# Patient Record
(demographics unavailable — no encounter records)

---

## 2025-01-24 NOTE — PHYSICAL EXAM
[NL (120)] : flexion 120 degrees [NL (30)] : extension 30 degrees [5___] : extension 5[unfilled]/5 [] : negative impingement maneuvers [Right] : right hip [AP] : anteroposterior [Lateral] : lateral [There are no fractures, subluxations or dislocations. No significant abnormalities are seen] : There are no fractures, subluxations or dislocations. No significant abnormalities are seen

## 2025-01-24 NOTE — HISTORY OF PRESENT ILLNESS
[Right Leg] : right leg [Sudden] : sudden [9] : 9 [Radiating] : radiating [Rest] : rest [Sitting] : sitting [Full time] : Work status: full time [de-identified] : 51 year old male with pain in the right hip region, gluteal region. Symptoms started last Saturday after a work out, he went a little heavier on legs and symptoms have been persistent. Using a tens unit, took Aleve (should avoid had partial nephrectomy secondary to kidney CA), Tylenol with only mild help. Worse at night when sleeping or the leg is extended.   [] : no [FreeTextEntry5] : Last Saturday started to get pain in rt quad into glute [FreeTextEntry6] : discomfort  [FreeTextEntry9] : occ Aleve, changing poisitions, Tens Unit [de-identified] : Drug rep

## 2025-01-24 NOTE — DISCUSSION/SUMMARY
[de-identified] : modify activities use elastic sleeve for structural support try OTC meds ice as needed try topical lidocaine for pain control reviewed current medications used by this patient home exercises for functional return 01/24/2025    RE:  AWA PETERS   Acct #- [id number]    Attention:  Nurse Reviewer /Medical Director  I am writing this letter as a medical necessity for PT program. Patient has tried analgesics, non-steroid anti-inflammatory agents,  hot or cold compresses,injections of corticosteroids, etc)  which in combination or by themselves has not worked. Based on my patient's condition, I strongly believe that the PT is medically needed.   Thank you for your time and consideration.     try lido patch

## 2025-01-31 NOTE — PHYSICAL EXAM
[No Acute Distress] : no acute distress [Well Nourished] : well nourished [Well Developed] : well developed [Well-Appearing] : well-appearing [Normal Sclera/Conjunctiva] : normal sclera/conjunctiva [PERRL] : pupils equal round and reactive to light [EOMI] : extraocular movements intact [Normal Outer Ear/Nose] : the outer ears and nose were normal in appearance [Normal Oropharynx] : the oropharynx was normal [No JVD] : no jugular venous distention [No Lymphadenopathy] : no lymphadenopathy [Supple] : supple [Thyroid Normal, No Nodules] : the thyroid was normal and there were no nodules present [No Respiratory Distress] : no respiratory distress  [No Accessory Muscle Use] : no accessory muscle use [Clear to Auscultation] : lungs were clear to auscultation bilaterally [Normal Rate] : normal rate  [Regular Rhythm] : with a regular rhythm [Normal S1, S2] : normal S1 and S2 [No Murmur] : no murmur heard [No Carotid Bruits] : no carotid bruits [No Abdominal Bruit] : a ~M bruit was not heard ~T in the abdomen [No Varicosities] : no varicosities [Pedal Pulses Present] : the pedal pulses are present [No Edema] : there was no peripheral edema [No Palpable Aorta] : no palpable aorta [No Extremity Clubbing/Cyanosis] : no extremity clubbing/cyanosis [Soft] : abdomen soft [Non Tender] : non-tender [Non-distended] : non-distended [No Masses] : no abdominal mass palpated [No HSM] : no HSM [Normal Bowel Sounds] : normal bowel sounds [Normal Posterior Cervical Nodes] : no posterior cervical lymphadenopathy [Normal Anterior Cervical Nodes] : no anterior cervical lymphadenopathy [No CVA Tenderness] : no CVA  tenderness [No Spinal Tenderness] : no spinal tenderness [No Rash] : no rash [Normal Gait] : normal gait [Normal] : Normal [Normal Affect] : the affect was normal [Normal Insight/Judgement] : insight and judgment were intact [Comprehensive Foot Exam Normal] : Right and left foot were examined and both feet are normal. No ulcers in either foot. Toes are normal and with full ROM.  Normal tactile sensation with monofilament testing throughout both feet [de-identified] : L Spine - Decreased ROM and Strength, Pain Associated with Mvt.  + R Leg Raise Test.   [de-identified] : Decreased R Lower Extremity Reflexes [TextBox_2] : Yes [TextBox_4] : HS

## 2025-01-31 NOTE — HISTORY OF PRESENT ILLNESS
[FreeTextEntry1] : C/O Bsck Pain with Radiation of Pain and Weakness to R Lower Extremity x 6 wks. [de-identified] : C/O Bsck Pain with Radiation of Pain and Weakness to R Lower Extremity x 6 wks.  Pt. has been goting to Chiropractor and PT, however, his symptoms have not improved.

## 2025-01-31 NOTE — HEALTH RISK ASSESSMENT
[Yes] : Yes [Monthly or less (1 pt)] : Monthly or less (1 point) [1 or 2 (0 pts)] : 1 or 2 (0 points) [Never (0 pts)] : Never (0 points) [No] : In the past 12 months have you used drugs other than those required for medical reasons? No [No falls in past year] : Patient reported no falls in the past year [0] : 2) Feeling down, depressed, or hopeless: Not at all (0) [PHQ-2 Negative - No further assessment needed] : PHQ-2 Negative - No further assessment needed [Patient/Caregiver not ready to engage] : , patient/caregiver not ready to engage [I will adhere to the patient's wishes.] : I will adhere to the patient's wishes. [Time Spent: ___ minutes] : Time Spent: [unfilled] minutes [Never] : Never [de-identified] : Average [Audit-CScore] : 1 [de-identified] : Average [facility] [[id number]] : 0 [AdvancecareDate] : 02/25

## 2025-01-31 NOTE — COUNSELING
[Fall prevention counseling provided] : Fall prevention counseling provided [Adequate lighting] : Adequate lighting [No throw rugs] : No throw rugs [Use proper foot wear] : Use proper foot wear [Behavioral health counseling provided] : Behavioral health counseling provided [Sleep ___ hours/day] : Sleep [unfilled] hours/day [Engage in a relaxing activity] : Engage in a relaxing activity [Plan in advance] : Plan in advance [Cessation strategies including cessation program discussed] : Cessation strategies including cessation program discussed [AUDIT-C Screening administered and reviewed] : AUDIT-C Screening administered and reviewed [Potential consequences of obesity discussed] : Potential consequences of obesity discussed [Benefits of weight loss discussed] : Benefits of weight loss discussed [Encouraged to increase physical activity] : Encouraged to increase physical activity [Weigh Self Weekly] : weigh self weekly [Decrease Portions] : decrease portions [None] : None [Good understanding] : Patient has a good understanding of lifestyle changes and steps needed to achieve self management goal

## 2025-01-31 NOTE — REVIEW OF SYSTEMS
[Patient Intake Form Reviewed] : Patient intake form was reviewed [Negative] : Heme/Lymph [FreeTextEntry2] : Overweight [FreeTextEntry5] : AVR, HTN [FreeTextEntry8] : BPH w LUTS, Nephrolithiasis, Low T [de-identified] : PTSD

## 2025-06-10 NOTE — PLAN
[FreeTextEntry1] : 52 yo male PMHx of renal cell carcinoma, CAD, HTN, DM, Obesity, and aortic valve replacement presents for 3 month follow up. Pt denies any concerns and complaints. Pt denies any recent hospitalizations, traumas, and illnesses. Pt denies fever, chills, headache, sob, chest pain. abdominal pain.  Care plan reviewed  Medications renewed and reviewed; changed bupropion to XR 24hrs 150 mg daily Labs drawn A1C 4.7% (last A1C 4.8%) RTC in 3 months

## 2025-06-10 NOTE — HISTORY OF PRESENT ILLNESS
[FreeTextEntry1] : 50 yo male PMHx of renal cell carcinoma, CAD, HTN, DM, Obesity, and aortic valve replacement presents for 3 month follow up.   [de-identified] : 52 yo male PMHx of renal cell carcinoma, CAD, HTN, DM, Obesity, and aortic valve replacement presents for 3 month follow up. Pt denies any concerns and complaints. Pt denies any recent hospitalizations, traumas, and illnesses. Pt denies fever, chills, headache, sob, chest pain. abdominal pain.

## 2025-06-10 NOTE — HEALTH RISK ASSESSMENT
[Never (0 pts)] : Never (0 points) [No] : In the past 12 months have you used drugs other than those required for medical reasons? No [No falls in past year] : Patient reported no falls in the past year [Patient/Caregiver not ready to engage] : , patient/caregiver not ready to engage [I will adhere to the patient's wishes.] : I will adhere to the patient's wishes. [Time Spent: ___ minutes] : Time Spent: [unfilled] minutes [de-identified] : Average [facility] : [facility] [de-identified] : Average [AdvancecareDate] : 02/25 [Monthly or less (1 pt)] : Monthly or less (1 point) [Yes] : Yes [1 or 2 (0 pts)] : 1 or 2 (0 points) [0] : 2) Feeling down, depressed, or hopeless: Not at all (0) [PHQ-2 Negative - No further assessment needed] : PHQ-2 Negative - No further assessment needed [Never] : Never [Audit-CScore] : 1 [[id number]] : 0

## 2025-06-10 NOTE — REVIEW OF SYSTEMS
[Patient Intake Form Reviewed] : Patient intake form was reviewed [FreeTextEntry5] : AVR, HTN [FreeTextEntry8] : BPH w LUTS, Nephrolithiasis, Low T [de-identified] : PTSD [Negative] : Heme/Lymph [FreeTextEntry2] : Obesity

## 2025-06-10 NOTE — PHYSICAL EXAM
[Normal Posterior Cervical Nodes] : no posterior cervical lymphadenopathy [Normal Anterior Cervical Nodes] : no anterior cervical lymphadenopathy [Normal] : Normal [Comprehensive Foot Exam Normal] : Right and left foot were examined and both feet are normal. No ulcers in either foot. Toes are normal and with full ROM.  Normal tactile sensation with monofilament testing throughout both feet [de-identified] : L Spine - Decreased ROM and Strength, Pain Associated with Mvt.  + R Leg Raise Test.   [de-identified] : Decreased R Lower Extremity Reflexes [TextBox_2] : Yes [TextBox_4] : HS [No Acute Distress] : no acute distress [Well Nourished] : well nourished [Well Developed] : well developed [Well-Appearing] : well-appearing [Normal Sclera/Conjunctiva] : normal sclera/conjunctiva [EOMI] : extraocular movements intact [PERRL] : pupils equal round and reactive to light [Normal Outer Ear/Nose] : the outer ears and nose were normal in appearance [No JVD] : no jugular venous distention [Normal Oropharynx] : the oropharynx was normal [No Lymphadenopathy] : no lymphadenopathy [Supple] : supple [Thyroid Normal, No Nodules] : the thyroid was normal and there were no nodules present [No Accessory Muscle Use] : no accessory muscle use [No Respiratory Distress] : no respiratory distress  [Clear to Auscultation] : lungs were clear to auscultation bilaterally [Normal Rate] : normal rate  [Regular Rhythm] : with a regular rhythm [Normal S1, S2] : normal S1 and S2 [No Murmur] : no murmur heard [No Carotid Bruits] : no carotid bruits [No Abdominal Bruit] : a ~M bruit was not heard ~T in the abdomen [No Varicosities] : no varicosities [Pedal Pulses Present] : the pedal pulses are present [No Edema] : there was no peripheral edema [No Palpable Aorta] : no palpable aorta [Soft] : abdomen soft [No Extremity Clubbing/Cyanosis] : no extremity clubbing/cyanosis [Non-distended] : non-distended [Non Tender] : non-tender [No Masses] : no abdominal mass palpated [No HSM] : no HSM [Normal Bowel Sounds] : normal bowel sounds [No CVA Tenderness] : no CVA  tenderness [No Spinal Tenderness] : no spinal tenderness [No Joint Swelling] : no joint swelling [No Rash] : no rash [Grossly Normal Strength/Tone] : grossly normal strength/tone [Coordination Grossly Intact] : coordination grossly intact [Normal Gait] : normal gait [No Focal Deficits] : no focal deficits [Normal Affect] : the affect was normal [Normal Insight/Judgement] : insight and judgment were intact

## 2025-06-10 NOTE — ASSESSMENT
[FreeTextEntry1] : 52 yo male PMHx of renal cell carcinoma, CAD, HTN, DM, Obesity, and aortic valve replacement presents for 1 month follow up. Pt denies any concerns and complaints. Pt denies any recent hospitalizations, traumas, and illnesses. Pt denies fever, chills, headache, sob, chest pain. abdominal pain.  Care plan reviewed  Medications renewed and reviewed  - Pt will pay cash with coupon for Modafinil Labs drawn Pt denies scan and states that he will follow up with his doctors in [facility] RTC in 3 months

## 2025-06-10 NOTE — HISTORY OF PRESENT ILLNESS
[FreeTextEntry1] : 52 yo male PMHx of renal cell carcinoma, CAD, HTN, DM, Obesity, and aortic valve replacement presents for 3 month follow up.   [de-identified] : 50 yo male PMHx of renal cell carcinoma, CAD, HTN, DM, Obesity, and aortic valve replacement presents for 3 month follow up. Pt denies any concerns and complaints. Pt denies any recent hospitalizations, traumas, and illnesses. Pt denies fever, chills, headache, sob, chest pain. abdominal pain.

## 2025-06-10 NOTE — HEALTH RISK ASSESSMENT
[Never (0 pts)] : Never (0 points) [No] : In the past 12 months have you used drugs other than those required for medical reasons? No [No falls in past year] : Patient reported no falls in the past year [Patient/Caregiver not ready to engage] : , patient/caregiver not ready to engage [I will adhere to the patient's wishes.] : I will adhere to the patient's wishes. [Time Spent: ___ minutes] : Time Spent: [unfilled] minutes [de-identified] : Average [de-identified] : Average [facility]] : 6 [AdvancecareDate] : 02/25 [Monthly or less (1 pt)] : Monthly or less (1 point) [Yes] : Yes [1 or 2 (0 pts)] : 1 or 2 (0 points) [0] : 2) Feeling down, depressed, or hopeless: Not at all (0) [PHQ-2 Negative - No further assessment needed] : PHQ-2 Negative - No further assessment needed [Never] : Never [Audit-CScore] : 1 [[id number]] : 0

## 2025-06-10 NOTE — COUNSELING
[Fall prevention counseling provided] : Fall prevention counseling provided [Adequate lighting] : Adequate lighting [No throw rugs] : No throw rugs [Use proper foot wear] : Use proper foot wear [Behavioral health counseling provided] : Behavioral health counseling provided [Sleep ___ hours/day] : Sleep [unfilled] hours/day [Plan in advance] : Plan in advance [Cessation strategies including cessation program discussed] : Cessation strategies including cessation program discussed [AUDIT-C Screening administered and reviewed] : AUDIT-C Screening administered and reviewed [Encouraged to increase physical activity] : Encouraged to increase physical activity [Weigh Self Weekly] : weigh self weekly [Decrease Portions] : decrease portions [None] : None [Potential consequences of obesity discussed] : Potential consequences of obesity discussed [Engage in a relaxing activity] : Engage in a relaxing activity [Benefits of weight loss discussed] : Benefits of weight loss discussed [Encouraged to maintain food diary] : Encouraged to maintain food diary [Encouraged to use exercise tracking device] : Encouraged to use exercise tracking device [Good understanding] : Patient has a good understanding of lifestyle changes and steps needed to achieve self management goal

## 2025-06-10 NOTE — COUNSELING
[Fall prevention counseling provided] : Fall prevention counseling provided [Adequate lighting] : Adequate lighting [No throw rugs] : No throw rugs [Use proper foot wear] : Use proper foot wear [Behavioral health counseling provided] : Behavioral health counseling provided [Sleep ___ hours/day] : Sleep [unfilled] hours/day [Plan in advance] : Plan in advance [Cessation strategies including cessation program discussed] : Cessation strategies including cessation program discussed [AUDIT-C Screening administered and reviewed] : AUDIT-C Screening administered and reviewed [Encouraged to increase physical activity] : Encouraged to increase physical activity [Weigh Self Weekly] : weigh self weekly [Decrease Portions] : decrease portions [None] : None [Engage in a relaxing activity] : Engage in a relaxing activity [Potential consequences of obesity discussed] : Potential consequences of obesity discussed [Benefits of weight loss discussed] : Benefits of weight loss discussed [Encouraged to maintain food diary] : Encouraged to maintain food diary [Encouraged to use exercise tracking device] : Encouraged to use exercise tracking device [Good understanding] : Patient has a good understanding of lifestyle changes and steps needed to achieve self management goal

## 2025-06-10 NOTE — ASSESSMENT
[FreeTextEntry1] : 52 yo male PMHx of renal cell carcinoma, CAD, HTN, DM, Obesity, and aortic valve replacement presents for 1 month follow up. Pt denies any concerns and complaints. Pt denies any recent hospitalizations, traumas, and illnesses. Pt denies fever, chills, headache, sob, chest pain. abdominal pain.  Care plan reviewed  Medications renewed and reviewed  - Pt will pay cash with coupon for Modafinil Labs drawn Pt denies scan and states that he will follow up with his doctors in [facility] RTC in 3 months

## 2025-06-10 NOTE — ASSESSMENT
[FreeTextEntry1] : 52 yo male PMHx of renal cell carcinoma, CAD, HTN, DM, Obesity, and aortic valve replacement presents for 1 month follow up. Pt denies any concerns and complaints. Pt denies any recent hospitalizations, traumas, and illnesses. Pt denies fever, chills, headache, sob, chest pain. abdominal pain.  Care plan reviewed  Medications renewed and reviewed  - Pt will pay cash with coupon for Modafinil Labs drawn Pt denies scan and states that he will follow up with his doctors in [facility] RTC in 3 months

## 2025-06-10 NOTE — REVIEW OF SYSTEMS
[Patient Intake Form Reviewed] : Patient intake form was reviewed [FreeTextEntry5] : AVR, HTN [FreeTextEntry8] : BPH w LUTS, Nephrolithiasis, Low T [de-identified] : PTSD [Negative] : Heme/Lymph [FreeTextEntry2] : Obesity

## 2025-06-10 NOTE — REVIEW OF SYSTEMS
[Patient Intake Form Reviewed] : Patient intake form was reviewed [FreeTextEntry5] : AVR, HTN [FreeTextEntry8] : BPH w LUTS, Nephrolithiasis, Low T [de-identified] : PTSD [Negative] : Heme/Lymph [FreeTextEntry2] : Obesity

## 2025-06-10 NOTE — HISTORY OF PRESENT ILLNESS
[FreeTextEntry1] : 52 yo male PMHx of renal cell carcinoma, CAD, HTN, DM, Obesity, and aortic valve replacement presents for 3 month follow up.   [de-identified] : 50 yo male PMHx of renal cell carcinoma, CAD, HTN, DM, Obesity, and aortic valve replacement presents for 3 month follow up. Pt denies any concerns and complaints. Pt denies any recent hospitalizations, traumas, and illnesses. Pt denies fever, chills, headache, sob, chest pain. abdominal pain.

## 2025-06-10 NOTE — PLAN
[FreeTextEntry1] : 50 yo male PMHx of renal cell carcinoma, CAD, HTN, DM, Obesity, and aortic valve replacement presents for 3 month follow up. Pt denies any concerns and complaints. Pt denies any recent hospitalizations, traumas, and illnesses. Pt denies fever, chills, headache, sob, chest pain. abdominal pain.  Care plan reviewed  Medications renewed and reviewed; changed bupropion to XR 24hrs 150 mg daily Labs drawn A1C 4.7% (last A1C 4.8%) RTC in 3 months

## 2025-06-10 NOTE — PHYSICAL EXAM
[Normal Posterior Cervical Nodes] : no posterior cervical lymphadenopathy [Normal Anterior Cervical Nodes] : no anterior cervical lymphadenopathy [Normal] : Normal [Comprehensive Foot Exam Normal] : Right and left foot were examined and both feet are normal. No ulcers in either foot. Toes are normal and with full ROM.  Normal tactile sensation with monofilament testing throughout both feet [de-identified] : L Spine - Decreased ROM and Strength, Pain Associated with Mvt.  + R Leg Raise Test.   [de-identified] : Decreased R Lower Extremity Reflexes [TextBox_2] : Yes [TextBox_4] : HS [Well Nourished] : well nourished [No Acute Distress] : no acute distress [Well Developed] : well developed [Normal Sclera/Conjunctiva] : normal sclera/conjunctiva [Well-Appearing] : well-appearing [PERRL] : pupils equal round and reactive to light [EOMI] : extraocular movements intact [Normal Outer Ear/Nose] : the outer ears and nose were normal in appearance [Normal Oropharynx] : the oropharynx was normal [No JVD] : no jugular venous distention [Supple] : supple [No Lymphadenopathy] : no lymphadenopathy [Thyroid Normal, No Nodules] : the thyroid was normal and there were no nodules present [No Respiratory Distress] : no respiratory distress  [No Accessory Muscle Use] : no accessory muscle use [Normal Rate] : normal rate  [Clear to Auscultation] : lungs were clear to auscultation bilaterally [Regular Rhythm] : with a regular rhythm [No Murmur] : no murmur heard [Normal S1, S2] : normal S1 and S2 [No Carotid Bruits] : no carotid bruits [No Varicosities] : no varicosities [No Abdominal Bruit] : a ~M bruit was not heard ~T in the abdomen [Pedal Pulses Present] : the pedal pulses are present [No Edema] : there was no peripheral edema [No Palpable Aorta] : no palpable aorta [No Extremity Clubbing/Cyanosis] : no extremity clubbing/cyanosis [Soft] : abdomen soft [Non Tender] : non-tender [Non-distended] : non-distended [No Masses] : no abdominal mass palpated [Normal Bowel Sounds] : normal bowel sounds [No HSM] : no HSM [No Spinal Tenderness] : no spinal tenderness [No CVA Tenderness] : no CVA  tenderness [No Joint Swelling] : no joint swelling [Grossly Normal Strength/Tone] : grossly normal strength/tone [No Rash] : no rash [Coordination Grossly Intact] : coordination grossly intact [No Focal Deficits] : no focal deficits [Normal Gait] : normal gait [Normal Affect] : the affect was normal [Normal Insight/Judgement] : insight and judgment were intact

## 2025-06-10 NOTE — HEALTH RISK ASSESSMENT
[Never (0 pts)] : Never (0 points) [No] : In the past 12 months have you used drugs other than those required for medical reasons? No [No falls in past year] : Patient reported no falls in the past year [Patient/Caregiver not ready to engage] : , patient/caregiver not ready to engage [I will adhere to the patient's wishes.] : I will adhere to the patient's wishes. [Time Spent: ___ minutes] : Time Spent: [unfilled] minutes [de-identified] : Average [facility] : 6 [de-identified] : Average [AdvancecareDate] : 02/25 [Monthly or less (1 pt)] : Monthly or less (1 point) [Yes] : Yes [1 or 2 (0 pts)] : 1 or 2 (0 points) [0] : 2) Feeling down, depressed, or hopeless: Not at all (0) [PHQ-2 Negative - No further assessment needed] : PHQ-2 Negative - No further assessment needed [Never] : Never [Audit-CScore] : 1 [[id number]] : 0

## 2025-06-10 NOTE — PHYSICAL EXAM
[Normal Posterior Cervical Nodes] : no posterior cervical lymphadenopathy [Normal Anterior Cervical Nodes] : no anterior cervical lymphadenopathy [Normal] : Normal [Comprehensive Foot Exam Normal] : Right and left foot were examined and both feet are normal. No ulcers in either foot. Toes are normal and with full ROM.  Normal tactile sensation with monofilament testing throughout both feet [de-identified] : L Spine - Decreased ROM and Strength, Pain Associated with Mvt.  + R Leg Raise Test.   [de-identified] : Decreased R Lower Extremity Reflexes [TextBox_2] : Yes [TextBox_4] : HS [No Acute Distress] : no acute distress [Well Nourished] : well nourished [Well Developed] : well developed [Normal Sclera/Conjunctiva] : normal sclera/conjunctiva [Well-Appearing] : well-appearing [EOMI] : extraocular movements intact [PERRL] : pupils equal round and reactive to light [Normal Outer Ear/Nose] : the outer ears and nose were normal in appearance [Normal Oropharynx] : the oropharynx was normal [No JVD] : no jugular venous distention [Supple] : supple [No Lymphadenopathy] : no lymphadenopathy [Thyroid Normal, No Nodules] : the thyroid was normal and there were no nodules present [No Accessory Muscle Use] : no accessory muscle use [No Respiratory Distress] : no respiratory distress  [Clear to Auscultation] : lungs were clear to auscultation bilaterally [Normal Rate] : normal rate  [Regular Rhythm] : with a regular rhythm [Normal S1, S2] : normal S1 and S2 [No Murmur] : no murmur heard [No Carotid Bruits] : no carotid bruits [No Abdominal Bruit] : a ~M bruit was not heard ~T in the abdomen [No Varicosities] : no varicosities [Pedal Pulses Present] : the pedal pulses are present [No Edema] : there was no peripheral edema [No Palpable Aorta] : no palpable aorta [Soft] : abdomen soft [No Extremity Clubbing/Cyanosis] : no extremity clubbing/cyanosis [Non-distended] : non-distended [Non Tender] : non-tender [No Masses] : no abdominal mass palpated [No HSM] : no HSM [Normal Bowel Sounds] : normal bowel sounds [No CVA Tenderness] : no CVA  tenderness [No Spinal Tenderness] : no spinal tenderness [No Joint Swelling] : no joint swelling [Grossly Normal Strength/Tone] : grossly normal strength/tone [No Rash] : no rash [Coordination Grossly Intact] : coordination grossly intact [Normal Gait] : normal gait [No Focal Deficits] : no focal deficits [Normal Affect] : the affect was normal [Normal Insight/Judgement] : insight and judgment were intact